# Patient Record
Sex: FEMALE | Race: OTHER | URBAN - METROPOLITAN AREA
[De-identification: names, ages, dates, MRNs, and addresses within clinical notes are randomized per-mention and may not be internally consistent; named-entity substitution may affect disease eponyms.]

---

## 2017-03-10 ENCOUNTER — OUTPATIENT (OUTPATIENT)
Dept: OUTPATIENT SERVICES | Facility: HOSPITAL | Age: 82
LOS: 1 days | Discharge: HOME | End: 2017-03-10

## 2017-06-27 DIAGNOSIS — E78.5 HYPERLIPIDEMIA, UNSPECIFIED: ICD-10-CM

## 2017-06-27 DIAGNOSIS — E11.9 TYPE 2 DIABETES MELLITUS WITHOUT COMPLICATIONS: ICD-10-CM

## 2017-09-01 ENCOUNTER — OUTPATIENT (OUTPATIENT)
Dept: OUTPATIENT SERVICES | Facility: HOSPITAL | Age: 82
LOS: 1 days | Discharge: HOME | End: 2017-09-01

## 2017-09-01 DIAGNOSIS — E11.65 TYPE 2 DIABETES MELLITUS WITH HYPERGLYCEMIA: ICD-10-CM

## 2017-09-01 DIAGNOSIS — E78.5 HYPERLIPIDEMIA, UNSPECIFIED: ICD-10-CM

## 2017-09-01 DIAGNOSIS — F32.2 MAJOR DEPRESSIVE DISORDER, SINGLE EPISODE, SEVERE WITHOUT PSYCHOTIC FEATURES: ICD-10-CM

## 2018-03-01 ENCOUNTER — OUTPATIENT (OUTPATIENT)
Dept: OUTPATIENT SERVICES | Facility: HOSPITAL | Age: 83
LOS: 1 days | Discharge: HOME | End: 2018-03-01

## 2018-03-01 DIAGNOSIS — I10 ESSENTIAL (PRIMARY) HYPERTENSION: ICD-10-CM

## 2018-03-01 DIAGNOSIS — E55.9 VITAMIN D DEFICIENCY, UNSPECIFIED: ICD-10-CM

## 2018-03-01 DIAGNOSIS — J45.20 MILD INTERMITTENT ASTHMA, UNCOMPLICATED: ICD-10-CM

## 2018-03-01 DIAGNOSIS — E78.5 HYPERLIPIDEMIA, UNSPECIFIED: ICD-10-CM

## 2018-03-01 DIAGNOSIS — F41.1 GENERALIZED ANXIETY DISORDER: ICD-10-CM

## 2018-03-01 DIAGNOSIS — E11.65 TYPE 2 DIABETES MELLITUS WITH HYPERGLYCEMIA: ICD-10-CM

## 2018-08-02 ENCOUNTER — OUTPATIENT (OUTPATIENT)
Dept: OUTPATIENT SERVICES | Facility: HOSPITAL | Age: 83
LOS: 1 days | Discharge: HOME | End: 2018-08-02

## 2018-08-02 DIAGNOSIS — E11.65 TYPE 2 DIABETES MELLITUS WITH HYPERGLYCEMIA: ICD-10-CM

## 2018-08-02 DIAGNOSIS — F41.1 GENERALIZED ANXIETY DISORDER: ICD-10-CM

## 2018-08-02 DIAGNOSIS — E78.5 HYPERLIPIDEMIA, UNSPECIFIED: ICD-10-CM

## 2018-08-02 DIAGNOSIS — E02 SUBCLINICAL IODINE-DEFICIENCY HYPOTHYROIDISM: ICD-10-CM

## 2018-08-02 DIAGNOSIS — I10 ESSENTIAL (PRIMARY) HYPERTENSION: ICD-10-CM

## 2019-01-04 ENCOUNTER — OUTPATIENT (OUTPATIENT)
Dept: OUTPATIENT SERVICES | Facility: HOSPITAL | Age: 84
LOS: 1 days | Discharge: HOME | End: 2019-01-04

## 2019-01-04 DIAGNOSIS — E02 SUBCLINICAL IODINE-DEFICIENCY HYPOTHYROIDISM: ICD-10-CM

## 2019-01-04 DIAGNOSIS — E55.9 VITAMIN D DEFICIENCY, UNSPECIFIED: ICD-10-CM

## 2019-01-04 DIAGNOSIS — E78.5 HYPERLIPIDEMIA, UNSPECIFIED: ICD-10-CM

## 2019-01-04 DIAGNOSIS — E11.65 TYPE 2 DIABETES MELLITUS WITH HYPERGLYCEMIA: ICD-10-CM

## 2019-01-04 DIAGNOSIS — F32.2 MAJOR DEPRESSIVE DISORDER, SINGLE EPISODE, SEVERE WITHOUT PSYCHOTIC FEATURES: ICD-10-CM

## 2019-05-17 ENCOUNTER — OUTPATIENT (OUTPATIENT)
Dept: OUTPATIENT SERVICES | Facility: HOSPITAL | Age: 84
LOS: 1 days | Discharge: HOME | End: 2019-05-17

## 2019-05-17 DIAGNOSIS — I10 ESSENTIAL (PRIMARY) HYPERTENSION: ICD-10-CM

## 2019-05-17 DIAGNOSIS — E78.5 HYPERLIPIDEMIA, UNSPECIFIED: ICD-10-CM

## 2019-09-09 ENCOUNTER — OUTPATIENT (OUTPATIENT)
Dept: OUTPATIENT SERVICES | Facility: HOSPITAL | Age: 84
LOS: 1 days | Discharge: HOME | End: 2019-09-09

## 2019-09-09 DIAGNOSIS — F41.1 GENERALIZED ANXIETY DISORDER: ICD-10-CM

## 2019-09-09 DIAGNOSIS — J45.20 MILD INTERMITTENT ASTHMA, UNCOMPLICATED: ICD-10-CM

## 2019-09-09 DIAGNOSIS — E78.5 HYPERLIPIDEMIA, UNSPECIFIED: ICD-10-CM

## 2019-09-09 DIAGNOSIS — R42 DIZZINESS AND GIDDINESS: ICD-10-CM

## 2019-09-09 DIAGNOSIS — I10 ESSENTIAL (PRIMARY) HYPERTENSION: ICD-10-CM

## 2024-03-07 PROBLEM — Z00.00 ENCOUNTER FOR PREVENTIVE HEALTH EXAMINATION: Status: ACTIVE | Noted: 2024-03-07

## 2024-11-01 ENCOUNTER — APPOINTMENT (OUTPATIENT)
Dept: NEUROLOGY | Facility: CLINIC | Age: 89
End: 2024-11-01
Payer: MEDICARE

## 2024-11-01 VITALS
DIASTOLIC BLOOD PRESSURE: 76 MMHG | HEIGHT: 61 IN | BODY MASS INDEX: 26.24 KG/M2 | HEART RATE: 83 BPM | SYSTOLIC BLOOD PRESSURE: 137 MMHG | WEIGHT: 139 LBS

## 2024-11-01 DIAGNOSIS — G30.8 OTHER ALZHEIMER'S DISEASE: ICD-10-CM

## 2024-11-01 DIAGNOSIS — Z86.39 PERSONAL HISTORY OF OTHER ENDOCRINE, NUTRITIONAL AND METABOLIC DISEASE: ICD-10-CM

## 2024-11-01 DIAGNOSIS — F02.80 OTHER ALZHEIMER'S DISEASE: ICD-10-CM

## 2024-11-01 PROCEDURE — 99204 OFFICE O/P NEW MOD 45 MIN: CPT

## 2024-11-01 RX ORDER — GLIPIZIDE 2.5 MG/1
2.5 TABLET ORAL
Refills: 0 | Status: ACTIVE | COMMUNITY

## 2024-11-01 RX ORDER — ATORVASTATIN CALCIUM 10 MG/1
10 TABLET, FILM COATED ORAL
Refills: 0 | Status: ACTIVE | COMMUNITY

## 2024-11-15 ENCOUNTER — APPOINTMENT (OUTPATIENT)
Dept: MRI IMAGING | Facility: CLINIC | Age: 89
End: 2024-11-15

## 2024-11-26 DIAGNOSIS — I72.5 ANEURYSM OF OTHER PRECEREBRAL ARTERIES: ICD-10-CM

## 2024-12-13 ENCOUNTER — APPOINTMENT (OUTPATIENT)
Dept: NEUROLOGY | Facility: CLINIC | Age: 88
End: 2024-12-13

## 2024-12-20 ENCOUNTER — APPOINTMENT (OUTPATIENT)
Dept: NEUROLOGY | Facility: CLINIC | Age: 88
End: 2024-12-20

## 2025-02-04 ENCOUNTER — EMERGENCY (EMERGENCY)
Facility: HOSPITAL | Age: 89
LOS: 0 days | Discharge: HOME | End: 2025-02-04
Attending: STUDENT IN AN ORGANIZED HEALTH CARE EDUCATION/TRAINING PROGRAM
Payer: MEDICARE

## 2025-02-04 VITALS
SYSTOLIC BLOOD PRESSURE: 116 MMHG | OXYGEN SATURATION: 100 % | TEMPERATURE: 97 F | RESPIRATION RATE: 18 BRPM | DIASTOLIC BLOOD PRESSURE: 75 MMHG | HEART RATE: 88 BPM

## 2025-02-04 DIAGNOSIS — F41.9 ANXIETY DISORDER, UNSPECIFIED: ICD-10-CM
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
## 2025-02-04 DIAGNOSIS — E78.5 HYPERLIPIDEMIA, UNSPECIFIED: ICD-10-CM

## 2025-02-04 DIAGNOSIS — S82.842A DISPLACED BIMALLEOLAR FRACTURE OF LEFT LOWER LEG, INITIAL ENCOUNTER FOR CLOSED FRACTURE: ICD-10-CM

## 2025-02-04 DIAGNOSIS — R33.9 RETENTION OF URINE, UNSPECIFIED: ICD-10-CM

## 2025-02-04 DIAGNOSIS — Z88.0 ALLERGY STATUS TO PENICILLIN: ICD-10-CM

## 2025-02-04 DIAGNOSIS — Z86.718 PERSONAL HISTORY OF OTHER VENOUS THROMBOSIS AND EMBOLISM: ICD-10-CM

## 2025-02-04 DIAGNOSIS — F03.90 UNSPECIFIED DEMENTIA, UNSPECIFIED SEVERITY, WITHOUT BEHAVIORAL DISTURBANCE, PSYCHOTIC DISTURBANCE, MOOD DISTURBANCE, AND ANXIETY: ICD-10-CM

## 2025-02-04 DIAGNOSIS — Z79.01 LONG TERM (CURRENT) USE OF ANTICOAGULANTS: ICD-10-CM

## 2025-02-04 DIAGNOSIS — E11.9 TYPE 2 DIABETES MELLITUS WITHOUT COMPLICATIONS: ICD-10-CM

## 2025-02-04 DIAGNOSIS — I10 ESSENTIAL (PRIMARY) HYPERTENSION: ICD-10-CM
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
## 2025-02-04 LAB
ALBUMIN SERPL ELPH-MCNC: 3.9 G/DL — SIGNIFICANT CHANGE UP (ref 3.5–5.2)
ALP SERPL-CCNC: 104 U/L — SIGNIFICANT CHANGE UP (ref 30–115)
ALT FLD-CCNC: 33 U/L — SIGNIFICANT CHANGE UP (ref 0–41)
ANION GAP SERPL CALC-SCNC: 13 MMOL/L — SIGNIFICANT CHANGE UP (ref 7–14)
APPEARANCE UR: ABNORMAL
APTT BLD: 32.2 SEC — SIGNIFICANT CHANGE UP (ref 27–39.2)
AST SERPL-CCNC: 48 U/L — HIGH (ref 0–41)
BACTERIA # UR AUTO: ABNORMAL /HPF
BASOPHILS # BLD AUTO: 0.03 K/UL — SIGNIFICANT CHANGE UP (ref 0–0.2)
BASOPHILS NFR BLD AUTO: 0.5 % — SIGNIFICANT CHANGE UP (ref 0–1)
BILIRUB SERPL-MCNC: 0.6 MG/DL — SIGNIFICANT CHANGE UP (ref 0.2–1.2)
BILIRUB UR-MCNC: NEGATIVE — SIGNIFICANT CHANGE UP
BUN SERPL-MCNC: 7 MG/DL — LOW (ref 10–20)
CALCIUM SERPL-MCNC: 9.2 MG/DL — SIGNIFICANT CHANGE UP (ref 8.4–10.5)
CAST: 1 /LPF — SIGNIFICANT CHANGE UP (ref 0–4)
CHLORIDE SERPL-SCNC: 91 MMOL/L — LOW (ref 98–110)
CO2 SERPL-SCNC: 34 MMOL/L — HIGH (ref 17–32)
COLOR SPEC: YELLOW — SIGNIFICANT CHANGE UP
CREAT SERPL-MCNC: 0.5 MG/DL — LOW (ref 0.7–1.5)
DIFF PNL FLD: NEGATIVE — SIGNIFICANT CHANGE UP
EGFR: 87 ML/MIN/1.73M2 — SIGNIFICANT CHANGE UP
EOSINOPHIL # BLD AUTO: 0.13 K/UL — SIGNIFICANT CHANGE UP (ref 0–0.7)
EOSINOPHIL NFR BLD AUTO: 2.3 % — SIGNIFICANT CHANGE UP (ref 0–8)
ETHANOL SERPL-MCNC: <10 MG/DL — SIGNIFICANT CHANGE UP
GLUCOSE BLDC GLUCOMTR-MCNC: 290 MG/DL — HIGH (ref 70–99)
GLUCOSE SERPL-MCNC: 314 MG/DL — HIGH (ref 70–99)
GLUCOSE UR QL: NEGATIVE MG/DL — SIGNIFICANT CHANGE UP
HCT VFR BLD CALC: 36.9 % — LOW (ref 37–47)
HGB BLD-MCNC: 12 G/DL — SIGNIFICANT CHANGE UP (ref 12–16)
IMM GRANULOCYTES NFR BLD AUTO: 0.7 % — HIGH (ref 0.1–0.3)
INR BLD: 1.04 RATIO — SIGNIFICANT CHANGE UP (ref 0.65–1.3)
KETONES UR-MCNC: NEGATIVE MG/DL — SIGNIFICANT CHANGE UP
LACTATE SERPL-SCNC: 1.3 MMOL/L — SIGNIFICANT CHANGE UP (ref 0.7–2)
LEUKOCYTE ESTERASE UR-ACNC: ABNORMAL
LYMPHOCYTES # BLD AUTO: 0.67 K/UL — LOW (ref 1.2–3.4)
LYMPHOCYTES # BLD AUTO: 11.7 % — LOW (ref 20.5–51.1)
MCHC RBC-ENTMCNC: 30.6 PG — SIGNIFICANT CHANGE UP (ref 27–31)
MCHC RBC-ENTMCNC: 32.5 G/DL — SIGNIFICANT CHANGE UP (ref 32–37)
MCV RBC AUTO: 94.1 FL — SIGNIFICANT CHANGE UP (ref 81–99)
MONOCYTES # BLD AUTO: 0.47 K/UL — SIGNIFICANT CHANGE UP (ref 0.1–0.6)
MONOCYTES NFR BLD AUTO: 8.2 % — SIGNIFICANT CHANGE UP (ref 1.7–9.3)
NEUTROPHILS # BLD AUTO: 4.37 K/UL — SIGNIFICANT CHANGE UP (ref 1.4–6.5)
NEUTROPHILS NFR BLD AUTO: 76.6 % — HIGH (ref 42.2–75.2)
NITRITE UR-MCNC: NEGATIVE — SIGNIFICANT CHANGE UP
NRBC # BLD: 0 /100 WBCS — SIGNIFICANT CHANGE UP (ref 0–0)
NRBC BLD-RTO: 0 /100 WBCS — SIGNIFICANT CHANGE UP (ref 0–0)
PH UR: 6 — SIGNIFICANT CHANGE UP (ref 5–8)
PLATELET # BLD AUTO: 242 K/UL — SIGNIFICANT CHANGE UP (ref 130–400)
PMV BLD: 10.6 FL — HIGH (ref 7.4–10.4)
POTASSIUM SERPL-MCNC: 3.6 MMOL/L — SIGNIFICANT CHANGE UP (ref 3.5–5)
POTASSIUM SERPL-SCNC: 3.6 MMOL/L — SIGNIFICANT CHANGE UP (ref 3.5–5)
PROT SERPL-MCNC: 6.1 G/DL — SIGNIFICANT CHANGE UP (ref 6–8)
PROT UR-MCNC: SIGNIFICANT CHANGE UP MG/DL
PROTHROM AB SERPL-ACNC: 12.3 SEC — SIGNIFICANT CHANGE UP (ref 9.95–12.87)
RBC # BLD: 3.92 M/UL — LOW (ref 4.2–5.4)
RBC # FLD: 14.9 % — HIGH (ref 11.5–14.5)
RBC CASTS # UR COMP ASSIST: 44 /HPF — HIGH (ref 0–4)
SODIUM SERPL-SCNC: 138 MMOL/L — SIGNIFICANT CHANGE UP (ref 135–146)
SP GR SPEC: 1.02 — SIGNIFICANT CHANGE UP (ref 1–1.03)
SQUAMOUS # UR AUTO: 1 /HPF — SIGNIFICANT CHANGE UP (ref 0–5)
UROBILINOGEN FLD QL: 1 MG/DL — SIGNIFICANT CHANGE UP (ref 0.2–1)
WBC # BLD: 5.71 K/UL — SIGNIFICANT CHANGE UP (ref 4.8–10.8)
WBC # FLD AUTO: 5.71 K/UL — SIGNIFICANT CHANGE UP (ref 4.8–10.8)
WBC UR QL: 6 /HPF — HIGH (ref 0–5)

## 2025-02-04 PROCEDURE — 70450 CT HEAD/BRAIN W/O DYE: CPT

## 2025-02-04 PROCEDURE — 85025 COMPLETE CBC W/AUTO DIFF WBC: CPT

## 2025-02-04 PROCEDURE — 85730 THROMBOPLASTIN TIME PARTIAL: CPT

## 2025-02-04 PROCEDURE — 83605 ASSAY OF LACTIC ACID: CPT

## 2025-02-04 PROCEDURE — 72125 CT NECK SPINE W/O DYE: CPT

## 2025-02-04 PROCEDURE — 71260 CT THORAX DX C+: CPT

## 2025-02-04 PROCEDURE — 74177 CT ABD & PELVIS W/CONTRAST: CPT

## 2025-02-04 PROCEDURE — 51702 INSERT TEMP BLADDER CATH: CPT

## 2025-02-04 PROCEDURE — 99285 EMERGENCY DEPT VISIT HI MDM: CPT

## 2025-02-04 PROCEDURE — 71045 X-RAY EXAM CHEST 1 VIEW: CPT

## 2025-02-04 PROCEDURE — 72125 CT NECK SPINE W/O DYE: CPT | Mod: 26

## 2025-02-04 PROCEDURE — 99284 EMERGENCY DEPT VISIT MOD MDM: CPT | Mod: 25

## 2025-02-04 PROCEDURE — 85610 PROTHROMBIN TIME: CPT

## 2025-02-04 PROCEDURE — 74177 CT ABD & PELVIS W/CONTRAST: CPT | Mod: 26

## 2025-02-04 PROCEDURE — 36415 COLL VENOUS BLD VENIPUNCTURE: CPT

## 2025-02-04 PROCEDURE — 71260 CT THORAX DX C+: CPT | Mod: 26

## 2025-02-04 PROCEDURE — 81001 URINALYSIS AUTO W/SCOPE: CPT

## 2025-02-04 PROCEDURE — 80053 COMPREHEN METABOLIC PANEL: CPT

## 2025-02-04 PROCEDURE — 80307 DRUG TEST PRSMV CHEM ANLYZR: CPT

## 2025-02-04 PROCEDURE — 70450 CT HEAD/BRAIN W/O DYE: CPT | Mod: 26

## 2025-02-04 PROCEDURE — 82962 GLUCOSE BLOOD TEST: CPT

## 2025-02-04 PROCEDURE — 71045 X-RAY EXAM CHEST 1 VIEW: CPT | Mod: 26

## 2025-02-04 NOTE — ED PROVIDER NOTE - NSFOLLOWUPINSTRUCTIONS_ED_ALL_ED_FT
You have  urinary retention, and have an indwelling Paul catheter to alleviate your bladder of urine. Please continue to take your medications as prescribed and change your catheter per instructions from your urology team. Please follow up with the urology team in the outpatient setting.    YOU MUST TAKE CEFPODOXIME 100MG TWO TIMES A DAY FOR 7 DAYS!!!! You have  urinary retention, and have an indwelling Paul catheter to alleviate your bladder of urine. Please continue to take your medications as prescribed and change your catheter per instructions from your urology team. Please follow up with the urology team in the outpatient setting.    YOU MUST TAKE CEFPODOXIME 100MG TWO TIMES A DAY FOR 7 DAYS.

## 2025-02-04 NOTE — ED PROVIDER NOTE - OBJECTIVE STATEMENT
89-year-old female past medical history significant for hypertension, diabetes, dyslipidemia, dementia, anxiety, anemia, frequent falls, recent by mall fracture of the left leg, on Lovenox for DVT currently, DNI/DNR/CMO, brought to the ED from a facility after an unwitnessed fall.  Patient is nonambulatory currently due to her by mall fracture and attempted to walk and was found lying on the ground in the SNF.  Patient unable to give a history due to dementia.

## 2025-02-04 NOTE — CONSULT NOTE ADULT - REASON FOR ADMISSION
89F (DNR, DNI, CMO) with PMH of HTN, DM, dyslipidemia, dementia, anxiety, anemia, frequent falls, recent LLE malleolus fx, DVT on lovenox, dementia presents s/p unwitnessed fall at nursing home (?HT, ?LOC, +AC). Patient NWB LLE, however attempted to ambulate at NH. Patient was found on the floor by staff.

## 2025-02-04 NOTE — CONSULT NOTE ADULT - SUBJECTIVE AND OBJECTIVE BOX
TRAUMA SURGERY CONSULT NOTE    Patient: RICHARD REEVES , 89y ([birth year])Female   MRN: [id number]  Location: [facility] ED  Visit: 02-04-25 Emergency  Date: 02-04-25 @ 12:45    HPI:  89F (DNR, DNI, CMO) with PMH of HTN, DM, dyslipidemia, dementia, anxiety, anemia, frequent falls, recent LLE malleolus fx, DVT on lovenox, dementia presents s/p unwitnessed fall at nursing home (?HT, ?LOC, +AC). Patient NWB LLE, however attempted to ambulate at NH. Patient was found on the floor by staff. ABCs intact. GCS 14 (E4, V5, M6). External signs of trauma: None    VITALS:  T(F): 97.3 (02-04-25 @ 12:16), Max: 97.3 (02-04-25 @ 12:16)  HR: 88 (02-04-25 @ 12:16) (88 - 88)  BP: 116/75 (02-04-25 @ 12:16) (116/75 - 116/75)  RR: 18 (02-04-25 @ 12:16) (18 - 18)  SpO2: 100% (02-04-25 @ 12:16) (100% - 100%)    PHYSICAL EXAM:  General: NAD, AAOx1-2, calm and cooperative  HEENT: NCAT, YARIEL, EOMI, Trachea ML, Neck supple, no subconjunctival hematoma  BACK: (-)C/T/L spine tenderness  Cardiac: RRR  Respiratory: CTAB, normal respiratory effort, no subq emphysema  Abdomen: Soft, non-distended, non-tender, no rebound, no guarding.  Pelvis: No instability  Rectal: Good tone, +stool, no blood, no renetta-anal masses/lesions, no fistulas, fissures, hemorrhoids  Musculoskeletal: Strength 5/5 BL UE/LE, passive and active ROM intact, compartments soft   - LLE ankle in hard cast, motor and sensation intact  Neuro: Sensation grossly intact and equal throughout, no focal deficits  Vascular: Pulses 2+ throughout, extremities well perfused  Skin: Warm/dry, normal color, no jaundice    LAB/STUDIES:  ***pending***    IMAGING:  ***pending***    ASSESSMENT:  89F (DNR, DNI, CMO) with PMH of HTN, DM, dyslipidemia, dementia, anxiety, anemia, frequent falls, recent LLE malleolus fx, DVT on lovenox, dementia presents s/p unwitnessed fall at nursing home (?HT, ?LOC, +AC). Patient NWB LLE, however attempted to ambulate at NH. Patient was found on the floor by staff. ABCs intact. GCS 14 (E4, V5, M6). External signs of trauma: None    Injuries Identified  ***pending***    PLAN:   - Trauma imaging: CXR, CTH, CT CSpine, CT C/A/P   - Trauma labs (CBC, BMP, Mg, Phos, Coags, Type and Screen, EtOH)   - Final plan pending above results   - PTSD Screen    Discussed with surgical attending on call, Dr. Rocha  SPECTRA [id number] TRAUMA SURGERY CONSULT NOTE    Patient: RICHARD REEVES , 89y ([birth year])Female   MRN: [id number]  Location: [facility] ED  Visit: 02-04-25 Emergency  Date: 02-04-25 @ 12:45    HPI:  89F (DNR, DNI, CMO) with PMH of HTN, DM, dyslipidemia, dementia, anxiety, anemia, frequent falls, recent LLE malleolus fx, DVT on lovenox, dementia presents s/p unwitnessed fall at nursing home (?HT, ?LOC, +AC). Patient NWB LLE, however attempted to ambulate at NH. Patient was found on the floor by staff. ABCs intact. GCS 14 (E4, V5, M6). External signs of trauma: None    VITALS:  T(F): 97.3 (02-04-25 @ 12:16), Max: 97.3 (02-04-25 @ 12:16)  HR: 88 (02-04-25 @ 12:16) (88 - 88)  BP: 116/75 (02-04-25 @ 12:16) (116/75 - 116/75)  RR: 18 (02-04-25 @ 12:16) (18 - 18)  SpO2: 100% (02-04-25 @ 12:16) (100% - 100%)    PHYSICAL EXAM:  General: NAD, AAOx1-2, calm and cooperative  HEENT: NCAT, YARIEL, EOMI, Trachea ML, Neck supple, no subconjunctival hematoma  BACK: (-)C/T/L spine tenderness  Cardiac: RRR  Respiratory: CTAB, normal respiratory effort, no subq emphysema  Abdomen: Soft, non-distended, non-tender, no rebound, no guarding.  Pelvis: No instability  Rectal: Good tone, +stool, no blood, no renetta-anal masses/lesions, no fistulas, fissures, hemorrhoids  Musculoskeletal: Strength 5/5 BL UE/LE, passive and active ROM intact, compartments soft   - LLE ankle in hard cast, motor and sensation intact  Neuro: Sensation grossly intact and equal throughout, no focal deficits  Vascular: Pulses 2+ throughout, extremities well perfused  Skin: Warm/dry, normal color, no jaundice    LAB/STUDIES:  LABS  CBC (02-04 @ 12:40)                              12.0                           5.71    )----------------(  242        76.6[H]% Neutrophils, 11.7[L]% Lymphocytes, ANC: 4.37                                36.9[L]    BMP (02-04 @ 12:40)             138     |  91[L]   |  7[L]  		Ca++ --      Ca 9.2                ---------------------------------( 314[H]		Mg --                 3.6     |  34[H]   |  0.5[L]			Ph --        LFTs (02-04 @ 12:40)      TPro 6.1 / Alb 3.9 / TBili 0.6 / DBili -- / AST 48[H] / ALT 33 / AlkPhos 104    Coags (02-04 @ 12:40)  aPTT 32.2 / INR 1.04 / PT 12.30      ABG (02-04 @ 12:40)      /  /  /  /  / %     Lactate:  1.3      --------------------------------------------------------------------------------------------    MICROBIOLOGY  Urinalysis (02-04 @ 12:40):     Color:  / Appearance:  / SG:  / pH:  / Gluc: 314[H] / Ketones:  / Bili:  / Urobili:  / Protein : / Nitrites:  / Leuk.Est:  / RBC:  / WBC:  / Sq Epi:  / Non Sq Epi:  / Bacteria          --------------------------------------------------------------------------------------------    I&O's Summary    IMAGING:  < from: CT Head No Cont (02.04.25 @ 13:01) >  IMPRESSION:  1.  No evidence of acute intracranial pathology.  2.  Bilateral mastoid opacification, correlate clinically.    < from: CT Cervical Spine No Cont (02.04.25 @ 13:05) >  IMPRESSION:  1.  Diffuse osteopenia. Multilevel mild vertebral body height loss (C4,   C5, C6, T1, T2, T3), age indeterminate.  2.  Otherwise, no evidence of acute fracture or subluxation.  3.  Multilevel degenerative changes as above.    < from: CT Chest w/ IV Cont (02.04.25 @ 13:09) >  IMPRESSION:  No acute traumatic abnormality in the chest abdomen and pelvis.  Severe distention of the urinary bladder with mild bilateral   hydroureteronephrosis.  Stool-filled colon and rectum.    ASSESSMENT:  89F (DNR, DNI, CMO) with PMH of HTN, DM, dyslipidemia, dementia, anxiety, anemia, frequent falls, recent LLE malleolus fx, DVT on lovenox, dementia presents s/p unwitnessed fall at nursing home (?HT, ?LOC, +AC). Patient NWB LLE, however attempted to ambulate at NH. Patient was found on the floor by staff. ABCs intact. GCS 14 (E4, V5, M6). External signs of trauma: None    Injuries Identified   - none    PLAN:   - No evidence of clinically significant acute traumatic injury   - No trauma surgery intervention   - If admitted, will follow for tertiary survey   - Dispo per ED    Discussed with surgical attending on call, Dr. Rocha  SPECTRA [id number] TRAUMA SURGERY CONSULT NOTE    Patient: RICHARD REEVES , 89y ([birth year])Female   MRN: [id number]  Location: [facility] ED  Visit: 02-04-25 Emergency  Date: 02-04-25 @ 12:45    HPI:  89F (DNR, DNI, CMO) with PMH of HTN, DM, dyslipidemia, dementia, anxiety, anemia, frequent falls, recent LLE malleolus fx, DVT on lovenox, dementia presents s/p unwitnessed fall at nursing home (?HT, ?LOC, +AC). Patient NWB LLE, however attempted to ambulate at NH. Patient was found on the floor by staff. ABCs intact. GCS 14 (E4, V5, M6). External signs of trauma: None    VITALS:  T(F): 97.3 (02-04-25 @ 12:16), Max: 97.3 (02-04-25 @ 12:16)  HR: 88 (02-04-25 @ 12:16) (88 - 88)  BP: 116/75 (02-04-25 @ 12:16) (116/75 - 116/75)  RR: 18 (02-04-25 @ 12:16) (18 - 18)  SpO2: 100% (02-04-25 @ 12:16) (100% - 100%)    PHYSICAL EXAM:  General: NAD, AAOx1-2, calm and cooperative  HEENT: NCAT, YARIEL, EOMI, Trachea ML, Neck supple, no subconjunctival hematoma  BACK: (-)C/T/L spine tenderness  Cardiac: RRR  Respiratory: CTAB, normal respiratory effort, no subq emphysema  Abdomen: distended, non-tender, no rebound, no guarding.  Pelvis: No instability  Rectal: Good tone, +stool, no blood, no renetta-anal masses/lesions, no fistulas, fissures, hemorrhoids  Musculoskeletal: Strength 5/5 BL UE/LE, passive and active ROM intact, compartments soft   - LLE ankle in hard cast, motor and sensation intact  Neuro: Sensation grossly intact and equal throughout, no focal deficits  Vascular: Pulses 2+ throughout, extremities well perfused  Skin: Warm/dry, normal color, no jaundice    LAB/STUDIES:  LABS  CBC (02-04 @ 12:40)                              12.0                           5.71    )----------------(  242        76.6[H]% Neutrophils, 11.7[L]% Lymphocytes, ANC: 4.37                                36.9[L]    BMP (02-04 @ 12:40)             138     |  91[L]   |  7[L]  		Ca++ --      Ca 9.2                ---------------------------------( 314[H]		Mg --                 3.6     |  34[H]   |  0.5[L]			Ph --        LFTs (02-04 @ 12:40)      TPro 6.1 / Alb 3.9 / TBili 0.6 / DBili -- / AST 48[H] / ALT 33 / AlkPhos 104    Coags (02-04 @ 12:40)  aPTT 32.2 / INR 1.04 / PT 12.30      ABG (02-04 @ 12:40)      /  /  /  /  / %     Lactate:  1.3      --------------------------------------------------------------------------------------------    MICROBIOLOGY  Urinalysis (02-04 @ 12:40):     Color:  / Appearance:  / SG:  / pH:  / Gluc: 314[H] / Ketones:  / Bili:  / Urobili:  / Protein : / Nitrites:  / Leuk.Est:  / RBC:  / WBC:  / Sq Epi:  / Non Sq Epi:  / Bacteria          --------------------------------------------------------------------------------------------    I&O's Summary    IMAGING:  < from: CT Head No Cont (02.04.25 @ 13:01) >  IMPRESSION:  1.  No evidence of acute intracranial pathology.  2.  Bilateral mastoid opacification, correlate clinically.    < from: CT Cervical Spine No Cont (02.04.25 @ 13:05) >  IMPRESSION:  1.  Diffuse osteopenia. Multilevel mild vertebral body height loss (C4,   C5, C6, T1, T2, T3), age indeterminate.  2.  Otherwise, no evidence of acute fracture or subluxation.  3.  Multilevel degenerative changes as above.    < from: CT Chest w/ IV Cont (02.04.25 @ 13:09) >  IMPRESSION:  No acute traumatic abnormality in the chest abdomen and pelvis.  Severe distention of the urinary bladder with mild bilateral   hydroureteronephrosis.  Stool-filled colon and rectum.    ASSESSMENT:  89F (DNR, DNI, CMO) with PMH of HTN, DM, dyslipidemia, dementia, anxiety, anemia, frequent falls, recent LLE malleolus fx, DVT on lovenox, dementia presents s/p unwitnessed fall at nursing home (?HT, ?LOC, +AC). Patient NWB LLE, however attempted to ambulate at NH. Patient was found on the floor by staff. ABCs intact. GCS 14 (E4, V5, M6). External signs of trauma: None    Injuries Identified   - none    PLAN:   - No evidence of clinically significant acute traumatic injury   - No trauma surgery intervention   - If admitted, will follow for tertiary survey   - Dispo per ED    Discussed with surgical attending on call, Dr. Rocha  SPECTRA [id number]

## 2025-02-04 NOTE — ED PROVIDER NOTE - PATIENT PORTAL LINK FT
You can access the FollowMyHealth Patient Portal offered by [facility] by registering at the following website: [email or web address]. By joining [facility]’s FollowMyHealth portal, you will also be able to view your health information using other applications (apps) compatible with our system.

## 2025-02-04 NOTE — ED PROVIDER NOTE - PROGRESS NOTE DETAILS
Paul catheter placed, 1800 cc of yellow urine returned. Discussion with nurse supervisor Vasile at [facility].  Aware patient will be returning to the facility.  Patient has a Paul to leg bag for urinary retention.  Patient to start cefpodoxime x 7 days.  All instructions put into the discharge instructions and verbally conveyed.

## 2025-02-04 NOTE — ED PROVIDER NOTE - ATTENDING CONTRIBUTION TO CARE
89-year-old female past medical history significant for hypertension, diabetes, dyslipidemia, dementia, anxiety, anemia, frequent falls, recent by mall fracture of the left leg, on Lovenox for DVT currently, DNI/DNR/CMO, brought to the ED from a facility after an unwitnessed fall.  Patient is nonambulatory currently due to her by mall fracture and attempted to walk and was found lying on the ground in the SNF.  Patient unable to give a history due to dementia. ALERT ORIENTED TO PERON AND PLACE ONLY. NAD GCS-15. NCAT. PERRL, EOMI. NO MIDLINE C SPINE TENDERNESS. LUNGS CLEAR B/L. CHEST NONTENDER, NO CREPITUS. RRR. ABD- + DISTENDED SOFT NONTENDER. PELVIS STABLE NONTENDER. BACK NONTENDER. NO SPINE TENDERNESS. NEURO EXAM NONFOCAL. L SHORT LEG SPLINT IN PLACE.

## 2025-02-04 NOTE — ED PROVIDER NOTE - CLINICAL SUMMARY MEDICAL DECISION MAKING FREE TEXT BOX
89-year-old female past medical history as documented, on Lovenox for DVT brought to the ED after an unwitnessed fall.  Patient unable to give a history.  Trauma alert on arrival.  All labs reviewed.  All diagnostic testing with no acute traumatic injuries.  CT with distended bladder.  Paul catheter placed.  1800 cc of yellow urine return.  Discussion with nurse supervisor at [facility] the patient to return to the facility.  Son Zak aware of patient's return to the facility and all results.  Staff at facility instructed for patient to be on cefpodoxime 100 mg for 7 days.  Patient discharged back to the facility.

## 2025-02-04 NOTE — CONSULT NOTE ADULT - ATTENDING COMMENTS
Trauma Attending Note Attestation  Patient was examined and evaluated at the bedside at 1:45 PM. Medications, radiological studies and all other relevant studies reviewed.     History as above.    Vital Signs Last 24 Hrs  T(C): 36.3 (04 Feb 2025 12:16), Max: 36.3 (04 Feb 2025 12:16)  T(F): 97.3 (04 Feb 2025 12:16), Max: 97.3 (04 Feb 2025 12:16)  HR: 88 (04 Feb 2025 12:16) (88 - 88)  BP: 116/75 (04 Feb 2025 12:16) (116/75 - 116/75)  BP(mean): --  RR: 18 (04 Feb 2025 12:16) (18 - 18)  SpO2: 100% (04 Feb 2025 12:16) (100% - 100%)    Parameters below as of 04 Feb 2025 12:16  Patient On (Oxygen Delivery Method): room air    Primary:  Airway - intact  Breathing - breath sounds bilaterally  Circulation - 2+ throughout  Disability - GCS 14, moving all extremities  Exposure - patient was exposed    I independently performed a medically appropriate exam. I have made revisions to the physical exam in the note above and agree with the exam as written.                          12.0   5.71  )-----------( 242      ( 04 Feb 2025 12:40 )             36.9     02-04    138  |  91[L]  |  7[L]  ----------------------------<  314[H]  3.6   |  34[H]  |  0.5[L]    Ca 9.2; Mg x ; Phos x       ( 04 Feb 2025 12:40 )  Alb: 3.9 g/dL / Pro: 6.1 g/dL / AlkPhos: 104 U/L / ALT: 33 U/L / AST: 48 U/L / GGT:x     / Tbili 0.6 mg/dL/ Dbili x     / Indbili x        PT/INR/PTT - ( 04 Feb 2025 12:40 )   PT: 12.30 sec; INR: 1.04 ratio; PTT 32.2 sec    Lactate, Blood: 1.3 mmol/L (02-04 @ 12:40)    CXR reviewed and interpreted by me - no hemothorax/pneumothorax  CTH/Csp reviewed and interpreted by me - no acute fractures or intracranial hemorrhage  CT C/A/P reviewed and interpreted by me - no acute intrathoracic or intra-abdominal traumatic injuries; distended bladder and distended rectum    Assessment/Plan:  89y Female PMH HTN, DM, dyslipidemia, dementia, anxiety, frequent falls s/p unwitnessed fall at nursing home. Has known L bimalleolar fracture. No acute traumatic injuries requiring trauma surgery intervention or admission to trauma service. Consider Paul catheter for urinary retention. Disposition per EM team.    Henry Rocha MD  Trauma/Acute Care Surgery/Surgical Critical Care Attending

## 2025-02-04 NOTE — ED PROVIDER NOTE - PHYSICAL EXAMINATION
ALERT ORIENTED TO PERON AND PLACE ONLY. NAD GCS-15. NCAT. PERRL, EOMI. NO MIDLINE C SPINE TENDERNESS. LUNGS CLEAR B/L. CHEST NONTENDER, NO CREPITUS. RRR. ABD- + DISTENDED SOFT NONTENDER. PELVIS STABLE NONTENDER. BACK NONTENDER. NO SPINE TENDERNESS. NEURO EXAM NONFOCAL. L SHORT LEG SPLINT IN PLACE.

## 2025-02-18 ENCOUNTER — APPOINTMENT (OUTPATIENT)
Dept: PODIATRY | Facility: HOME HEALTH | Age: 89
End: 2025-02-18